# Patient Record
Sex: FEMALE | Race: WHITE | NOT HISPANIC OR LATINO | URBAN - METROPOLITAN AREA
[De-identification: names, ages, dates, MRNs, and addresses within clinical notes are randomized per-mention and may not be internally consistent; named-entity substitution may affect disease eponyms.]

---

## 2018-11-30 ENCOUNTER — OFFICE (OUTPATIENT)
Dept: URBAN - METROPOLITAN AREA CLINIC 78 | Facility: CLINIC | Age: 53
End: 2018-11-30
Payer: COMMERCIAL

## 2018-11-30 VITALS
WEIGHT: 205 LBS | HEART RATE: 88 BPM | TEMPERATURE: 98 F | DIASTOLIC BLOOD PRESSURE: 89 MMHG | HEIGHT: 66 IN | SYSTOLIC BLOOD PRESSURE: 138 MMHG

## 2018-11-30 DIAGNOSIS — Z99.81 DEPENDENCE ON SUPPLEMENTAL OXYGEN: ICD-10-CM

## 2018-11-30 DIAGNOSIS — K62.5 HEMORRHAGE OF ANUS AND RECTUM: ICD-10-CM

## 2018-11-30 DIAGNOSIS — D50.9 IRON DEFICIENCY ANEMIA, UNSPECIFIED: ICD-10-CM

## 2018-11-30 DIAGNOSIS — K21.9 GASTRO-ESOPHAGEAL REFLUX DISEASE WITHOUT ESOPHAGITIS: ICD-10-CM

## 2018-11-30 PROCEDURE — 99244 OFF/OP CNSLTJ NEW/EST MOD 40: CPT

## 2018-11-30 NOTE — SERVICEHPINOTES
LAXMI MACIAS   is a   53   year old    female who is being seen in consultation at the request of   CARRIE PETERSON   for anemia. She notes anemia for a few years and following with Dr. Peterson for this. She takes iron once a day. In 2014, she underwent a colonoscopy which showed "suboptimal prep", one year recall given. She denies diarrhea, abdominal pain, weight loss, and chest pain. Her father had colonic polyps. She notes a hx of intermittent constipation she takes fiber supplementation which helps. Anything "stronger" causes bloating. If her stool is at all hard, she will see BRBPR during bowel movements. No black stool prior to starting iron supplementation. She takes daily NSAIDs for fibromyalgia. She asks for narcotics today. She is on oxygen for chronically low oxygenation sats w/o oxygen are 88%. She follows with pulmonology. She had a recent cardiac evaluation which was unremarkable per patient. She has reflux as well at times. She takes Lansoprazole which controls her reflux. No nocturnal reflux. No dysphagia, vomiting. She notes chronic nausea but she thinks this is d/t chronic neck pain. She notes early satiety but this is a chronic issue for her. Blood work from 7/2018 shows a hgb of 11 and a hct of 33.2%, MCV 97, and normal WBC. In September, her hgb was 11.5, hct 36%, MCV 97.

## 2019-02-26 ENCOUNTER — ON CAMPUS - OUTPATIENT (OUTPATIENT)
Dept: URBAN - METROPOLITAN AREA HOSPITAL 63 | Facility: HOSPITAL | Age: 54
End: 2019-02-26

## 2019-02-26 DIAGNOSIS — K62.5 HEMORRHAGE OF ANUS AND RECTUM: ICD-10-CM

## 2019-02-26 DIAGNOSIS — D50.9 IRON DEFICIENCY ANEMIA, UNSPECIFIED: ICD-10-CM

## 2019-02-26 DIAGNOSIS — K21.9 GASTRO-ESOPHAGEAL REFLUX DISEASE WITHOUT ESOPHAGITIS: ICD-10-CM

## 2019-02-26 DIAGNOSIS — K63.5 POLYP OF COLON: ICD-10-CM

## 2019-02-26 PROCEDURE — 43239 EGD BIOPSY SINGLE/MULTIPLE: CPT

## 2019-02-26 PROCEDURE — 45385 COLONOSCOPY W/LESION REMOVAL: CPT
